# Patient Record
Sex: FEMALE | Race: WHITE | NOT HISPANIC OR LATINO | Employment: FULL TIME | URBAN - METROPOLITAN AREA
[De-identification: names, ages, dates, MRNs, and addresses within clinical notes are randomized per-mention and may not be internally consistent; named-entity substitution may affect disease eponyms.]

---

## 2017-09-18 ENCOUNTER — OFFICE VISIT (OUTPATIENT)
Dept: URGENT CARE | Facility: PHYSICIAN GROUP | Age: 67
End: 2017-09-18
Payer: COMMERCIAL

## 2017-09-18 ENCOUNTER — HOSPITAL ENCOUNTER (OUTPATIENT)
Dept: RADIOLOGY | Facility: MEDICAL CENTER | Age: 67
End: 2017-09-18
Attending: PHYSICIAN ASSISTANT
Payer: COMMERCIAL

## 2017-09-18 VITALS
HEIGHT: 63 IN | BODY MASS INDEX: 35.44 KG/M2 | OXYGEN SATURATION: 93 % | HEART RATE: 78 BPM | WEIGHT: 200 LBS | DIASTOLIC BLOOD PRESSURE: 60 MMHG | RESPIRATION RATE: 14 BRPM | TEMPERATURE: 97.8 F | SYSTOLIC BLOOD PRESSURE: 112 MMHG

## 2017-09-18 DIAGNOSIS — S67.10XA CRUSH INJURY TO FINGER, INITIAL ENCOUNTER: ICD-10-CM

## 2017-09-18 PROCEDURE — 99213 OFFICE O/P EST LOW 20 MIN: CPT | Performed by: PHYSICIAN ASSISTANT

## 2017-09-18 RX ORDER — GLIMEPIRIDE 2 MG/1
4 TABLET ORAL EVERY MORNING
COMMUNITY

## 2017-09-18 ASSESSMENT — ENCOUNTER SYMPTOMS
FOCAL WEAKNESS: 0
FEVER: 0
NEUROLOGICAL NEGATIVE: 1
MYALGIAS: 0
SENSORY CHANGE: 0
TREMORS: 0
TINGLING: 0

## 2017-09-18 ASSESSMENT — PAIN SCALES - GENERAL: PAINLEVEL: NO PAIN

## 2017-09-18 NOTE — PROGRESS NOTES
"Subjective:      Marcelina Christensen is a 67 y.o. female who presents with Finger Injury (injured finger 9/17 hammering a nail and missed and hit Lft Pointer Finger- swollen and bruised)            Crush injury of the left index finger yesterday.  She was at home using a hammer on a sign when she accidentally crushed her finger.  She developed mild bruising yesterday, today bruising has increased.  Slight discoloration under the nail.  No treatment prior to arrival.          Review of Systems   Constitutional: Negative for fever and malaise/fatigue.   Musculoskeletal: Negative for joint pain and myalgias.   Skin: Negative for itching and rash.   Neurological: Negative.  Negative for tingling, tremors, sensory change and focal weakness.          Objective:     /60   Pulse 78   Temp 36.6 °C (97.8 °F)   Resp 14   Ht 1.6 m (5' 3\")   Wt 90.7 kg (200 lb)   SpO2 93%   BMI 35.43 kg/m²      Physical Exam   Constitutional: She appears well-developed and well-nourished.   Musculoskeletal: Normal range of motion.   Neurological: She is alert.   Sensation of the distal left index finger intact   Skin: Skin is warm and dry. Capillary refill takes less than 2 seconds.   Ecchymosis of the left index finger from DIP joint distally, no open wounds.  Faint evidence of developing subungual hematoma   Psychiatric: She has a normal mood and affect. Her behavior is normal. Judgment and thought content normal.   Nursing note and vitals reviewed.         Left finger X-ray:   (Not Completed)    Assessment/Plan:     1. Crush injury to finger, initial encounter  Patient left before x-ray could be completed.  Patient stated to the MA that she couldn't wait any longer and would return tomorrow.    - DX-FINGER(S) 2+ LEFT; Future      "

## 2021-01-15 DIAGNOSIS — Z23 NEED FOR VACCINATION: ICD-10-CM
